# Patient Record
Sex: MALE | Race: ASIAN | NOT HISPANIC OR LATINO | URBAN - METROPOLITAN AREA
[De-identification: names, ages, dates, MRNs, and addresses within clinical notes are randomized per-mention and may not be internally consistent; named-entity substitution may affect disease eponyms.]

---

## 2023-08-29 ENCOUNTER — EMERGENCY (EMERGENCY)
Facility: HOSPITAL | Age: 23
LOS: 0 days | Discharge: ANOTHER HOSPITAL | End: 2023-08-30
Attending: EMERGENCY MEDICINE
Payer: SELF-PAY

## 2023-08-29 VITALS
HEART RATE: 115 BPM | WEIGHT: 251.11 LBS | TEMPERATURE: 98 F | SYSTOLIC BLOOD PRESSURE: 138 MMHG | HEIGHT: 69 IN | OXYGEN SATURATION: 96 % | DIASTOLIC BLOOD PRESSURE: 98 MMHG | RESPIRATION RATE: 18 BRPM

## 2023-08-29 DIAGNOSIS — R20.2 PARESTHESIA OF SKIN: ICD-10-CM

## 2023-08-29 DIAGNOSIS — F60.89 OTHER SPECIFIC PERSONALITY DISORDERS: ICD-10-CM

## 2023-08-29 DIAGNOSIS — F41.9 ANXIETY DISORDER, UNSPECIFIED: ICD-10-CM

## 2023-08-29 DIAGNOSIS — F64.9 GENDER IDENTITY DISORDER, UNSPECIFIED: ICD-10-CM

## 2023-08-29 DIAGNOSIS — F33.1 MAJOR DEPRESSIVE DISORDER, RECURRENT, MODERATE: ICD-10-CM
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
## 2023-08-29 DIAGNOSIS — R45.851 SUICIDAL IDEATIONS: ICD-10-CM

## 2023-08-29 DIAGNOSIS — Z20.822 CONTACT WITH AND (SUSPECTED) EXPOSURE TO COVID-19: ICD-10-CM

## 2023-08-29 DIAGNOSIS — Z56.0 UNEMPLOYMENT, UNSPECIFIED: ICD-10-CM

## 2023-08-29 LAB
AMPHET UR-MCNC: NEGATIVE — SIGNIFICANT CHANGE UP
ANION GAP SERPL CALC-SCNC: 8 MMOL/L — SIGNIFICANT CHANGE UP (ref 5–17)
APAP SERPL-MCNC: < 2 UG/ML (ref 10–30)
APPEARANCE UR: CLEAR — SIGNIFICANT CHANGE UP
BARBITURATES UR SCN-MCNC: NEGATIVE — SIGNIFICANT CHANGE UP
BASOPHILS # BLD AUTO: 0.09 K/UL — SIGNIFICANT CHANGE UP (ref 0–0.2)
BASOPHILS NFR BLD AUTO: 1 % — SIGNIFICANT CHANGE UP (ref 0–2)
BENZODIAZ UR-MCNC: NEGATIVE — SIGNIFICANT CHANGE UP
BILIRUB UR-MCNC: NEGATIVE — SIGNIFICANT CHANGE UP
BUN SERPL-MCNC: 8 MG/DL — SIGNIFICANT CHANGE UP (ref 7–23)
CALCIUM SERPL-MCNC: 9 MG/DL — SIGNIFICANT CHANGE UP (ref 8.5–10.1)
CHLORIDE SERPL-SCNC: 104 MMOL/L — SIGNIFICANT CHANGE UP (ref 96–108)
CO2 SERPL-SCNC: 23 MMOL/L — SIGNIFICANT CHANGE UP (ref 22–31)
COCAINE METAB.OTHER UR-MCNC: NEGATIVE — SIGNIFICANT CHANGE UP
COLOR SPEC: YELLOW — SIGNIFICANT CHANGE UP
CREAT SERPL-MCNC: 0.93 MG/DL — SIGNIFICANT CHANGE UP (ref 0.5–1.3)
DIFF PNL FLD: NEGATIVE — SIGNIFICANT CHANGE UP
EGFR: 119 ML/MIN/1.73M2 — SIGNIFICANT CHANGE UP
EOSINOPHIL # BLD AUTO: 0.1 K/UL — SIGNIFICANT CHANGE UP (ref 0–0.5)
EOSINOPHIL NFR BLD AUTO: 1.1 % — SIGNIFICANT CHANGE UP (ref 0–6)
ETHANOL SERPL-MCNC: <10 MG/DL — SIGNIFICANT CHANGE UP (ref 0–10)
GLUCOSE SERPL-MCNC: 133 MG/DL — HIGH (ref 70–99)
GLUCOSE UR QL: NEGATIVE — SIGNIFICANT CHANGE UP
HCT VFR BLD CALC: 42.5 % — SIGNIFICANT CHANGE UP (ref 39–50)
HGB BLD-MCNC: 14.9 G/DL — SIGNIFICANT CHANGE UP (ref 13–17)
IMM GRANULOCYTES NFR BLD AUTO: 0.5 % — SIGNIFICANT CHANGE UP (ref 0–0.9)
KETONES UR-MCNC: NEGATIVE — SIGNIFICANT CHANGE UP
LEUKOCYTE ESTERASE UR-ACNC: NEGATIVE — SIGNIFICANT CHANGE UP
LYMPHOCYTES # BLD AUTO: 3 K/UL — SIGNIFICANT CHANGE UP (ref 1–3.3)
LYMPHOCYTES # BLD AUTO: 32 % — SIGNIFICANT CHANGE UP (ref 13–44)
MCHC RBC-ENTMCNC: 29.7 PG — SIGNIFICANT CHANGE UP (ref 27–34)
MCHC RBC-ENTMCNC: 35.1 GM/DL — SIGNIFICANT CHANGE UP (ref 32–36)
MCV RBC AUTO: 84.8 FL — SIGNIFICANT CHANGE UP (ref 80–100)
METHADONE UR-MCNC: NEGATIVE — SIGNIFICANT CHANGE UP
MONOCYTES # BLD AUTO: 0.56 K/UL — SIGNIFICANT CHANGE UP (ref 0–0.9)
MONOCYTES NFR BLD AUTO: 6 % — SIGNIFICANT CHANGE UP (ref 2–14)
NEUTROPHILS # BLD AUTO: 5.58 K/UL — SIGNIFICANT CHANGE UP (ref 1.8–7.4)
NEUTROPHILS NFR BLD AUTO: 59.4 % — SIGNIFICANT CHANGE UP (ref 43–77)
NITRITE UR-MCNC: NEGATIVE — SIGNIFICANT CHANGE UP
OPIATES UR-MCNC: NEGATIVE — SIGNIFICANT CHANGE UP
PCP SPEC-MCNC: SIGNIFICANT CHANGE UP
PCP UR-MCNC: NEGATIVE — SIGNIFICANT CHANGE UP
PH UR: 6.5 — SIGNIFICANT CHANGE UP (ref 5–8)
PLATELET # BLD AUTO: 331 K/UL — SIGNIFICANT CHANGE UP (ref 150–400)
POTASSIUM SERPL-MCNC: 4 MMOL/L — SIGNIFICANT CHANGE UP (ref 3.5–5.3)
POTASSIUM SERPL-SCNC: 4 MMOL/L — SIGNIFICANT CHANGE UP (ref 3.5–5.3)
PROT UR-MCNC: NEGATIVE — SIGNIFICANT CHANGE UP
RBC # BLD: 5.01 M/UL — SIGNIFICANT CHANGE UP (ref 4.2–5.8)
RBC # FLD: 13.1 % — SIGNIFICANT CHANGE UP (ref 10.3–14.5)
SALICYLATES SERPL-MCNC: <1.7 MG/DL (ref 2.8–20)
SARS-COV-2 RNA SPEC QL NAA+PROBE: SIGNIFICANT CHANGE UP
SODIUM SERPL-SCNC: 135 MMOL/L — SIGNIFICANT CHANGE UP (ref 135–145)
SP GR SPEC: 1 — LOW (ref 1.01–1.02)
THC UR QL: NEGATIVE — SIGNIFICANT CHANGE UP
UROBILINOGEN FLD QL: NEGATIVE — SIGNIFICANT CHANGE UP
WBC # BLD: 9.38 K/UL — SIGNIFICANT CHANGE UP (ref 3.8–10.5)
WBC # FLD AUTO: 9.38 K/UL — SIGNIFICANT CHANGE UP (ref 3.8–10.5)

## 2023-08-29 PROCEDURE — 99285 EMERGENCY DEPT VISIT HI MDM: CPT

## 2023-08-29 PROCEDURE — 99285 EMERGENCY DEPT VISIT HI MDM: CPT | Mod: 25

## 2023-08-29 PROCEDURE — 80307 DRUG TEST PRSMV CHEM ANLYZR: CPT

## 2023-08-29 PROCEDURE — 80048 BASIC METABOLIC PNL TOTAL CA: CPT

## 2023-08-29 PROCEDURE — 81003 URINALYSIS AUTO W/O SCOPE: CPT

## 2023-08-29 PROCEDURE — 93005 ELECTROCARDIOGRAM TRACING: CPT

## 2023-08-29 PROCEDURE — 90792 PSYCH DIAG EVAL W/MED SRVCS: CPT

## 2023-08-29 PROCEDURE — 93010 ELECTROCARDIOGRAM REPORT: CPT

## 2023-08-29 PROCEDURE — 85025 COMPLETE CBC W/AUTO DIFF WBC: CPT

## 2023-08-29 PROCEDURE — 87635 SARS-COV-2 COVID-19 AMP PRB: CPT

## 2023-08-29 PROCEDURE — 36415 COLL VENOUS BLD VENIPUNCTURE: CPT

## 2023-08-29 RX ORDER — SPIRONOLACTONE 25 MG/1
100 TABLET, FILM COATED ORAL DAILY
Refills: 0 | Status: DISCONTINUED | OUTPATIENT
Start: 2023-08-29 | End: 2023-08-29

## 2023-08-29 RX ORDER — BUPROPION HYDROCHLORIDE 150 MG/1
150 TABLET, EXTENDED RELEASE ORAL DAILY
Refills: 0 | Status: DISCONTINUED | OUTPATIENT
Start: 2023-08-30 | End: 2023-08-30

## 2023-08-29 RX ORDER — ESCITALOPRAM OXALATE 10 MG/1
10 TABLET, FILM COATED ORAL DAILY
Refills: 0 | Status: DISCONTINUED | OUTPATIENT
Start: 2023-08-30 | End: 2023-08-30

## 2023-08-29 RX ORDER — SPIRONOLACTONE 25 MG/1
100 TABLET, FILM COATED ORAL DAILY
Refills: 0 | Status: DISCONTINUED | OUTPATIENT
Start: 2023-08-30 | End: 2023-08-30

## 2023-08-29 SDOH — ECONOMIC STABILITY - INCOME SECURITY: UNEMPLOYMENT, UNSPECIFIED: Z56.0

## 2023-08-29 NOTE — ED BEHAVIORAL HEALTH ASSESSMENT NOTE - SUBSTANCE USE
[email or web address] None known

## 2023-08-29 NOTE — ED PROVIDER NOTE - PROGRESS NOTE DETAILS
pt accepted to [facility]. will transfer pt medical work up in the ED is negative. Pt complaining of hand pain left, by base of thumb, no trauma or strain, moving it fine, had pain for some weeks. Exam is normal, gor rom, no swelling, good sensation, recommend tylenol prn and follow up. No xray warranted at this point. Pt content with plan and complient.

## 2023-08-29 NOTE — ED ADULT NURSE REASSESSMENT NOTE - NS ED NURSE REASSESS COMMENT FT1
Pt resting comfortably in stretcher, resp. even and unlabored. Denies any complaints at this time. Calm and cooperative. Updated on plan of care, verbalizes understanding. 1:1 continues to be at bedside. In NAD.

## 2023-08-29 NOTE — ED BEHAVIORAL HEALTH ASSESSMENT NOTE - DESCRIPTION
Vital Signs Last 24 Hrs  T(C): 36.8 (29 Aug 2023 11:19), Max: 36.8 (29 Aug 2023 11:19)  T(F): 98.2 (29 Aug 2023 11:19), Max: 98.2 (29 Aug 2023 11:19)  HR: 115 (29 Aug 2023 11:19) (115 - 115)  BP: 138/98 (29 Aug 2023 11:19) (138/98 - 138/98)  BP(mean): --  RR: 18 (29 Aug 2023 11:19) (18 - 18)  SpO2: 96% (29 Aug 2023 11:19) (96% - 96%)    Parameters below as of 29 Aug 2023 11:19  Patient On (Oxygen Delivery Method): room air see hospitalist note See HPI

## 2023-08-29 NOTE — ED BEHAVIORAL HEALTH ASSESSMENT NOTE - PATIENT'S CHIEF COMPLAINT
"I guess what was special about the other night is that I actually got up and held the knife up to my throat."

## 2023-08-29 NOTE — ED ADULT TRIAGE NOTE - CHIEF COMPLAINT QUOTE
BIB [employer] badge #[id number] and #[id number] for suicidal ideation. pt reports x2 days ago she was experiencing suicidal thoughts. yesterday she called her counselor and disclosed that she felt like she wanted to harm herself. x2 days ago, pt took knife to throat but was "just holding it there". when asked if she is experiencing homicidal ideation, pt reports "not really, no". pt denies AVH. pt brought into behavioral health room, 1:1 initiated from triage.

## 2023-08-29 NOTE — ED ADULT NURSE NOTE - PAIN RATING/NUMBER SCALE (0-10): ACTIVITY
[] : no respiratory distress [Respiration, Rhythm And Depth] : normal respiratory rhythm and effort [Auscultation Breath Sounds / Voice Sounds] : lungs were clear to auscultation bilaterally [Heart Rate And Rhythm] : heart rate was normal and rhythm regular [Heart Sounds] : normal S1 and S2 [Clean] : clean [Dry] : dry [Healing Well] : healing well [FreeTextEntry5] : right leg [FreeTextEntry3] : trace right lower extremity edema 0 (no pain/absence of nonverbal indicators of pain)

## 2023-08-29 NOTE — ED PROVIDER NOTE - OBJECTIVE STATEMENT
24 y/o male transitioning to female (goes by Mackenzie) with a PMHx of anxiety, depression presents to the ED for evaluation. Pt reports he switched majors in college. Pt states he was not doing well academically, is now on academic suspension and returned home from college. Pt's parents found out he is HRT and they got into an argument. Pt had a knife to his neck 2 days ago but did not cut himself. Denies current SI. +intermittent numbness and tingling thumb of left hand. No other complaints at this time. 24 y/o male transitioning to female (goes by Mackenzie) with a PMHx of anxiety, depression presents to the ED for evaluation. Pt reports he switched majors in college. Pt states he was not doing well academically, is now on academic suspension and returned home from college. Pt's parents found out he is taking HRT and they got into an argument. Pt had a knife to his neck 2 days ago but did not cut himself. Denies current SI. +intermittent numbness and tingling thumb of left hand. No other complaints at this time.

## 2023-08-29 NOTE — ED BEHAVIORAL HEALTH ASSESSMENT NOTE - NSBHATTESTAPPBILLTIME_PSY_A_CORE
I attest my time as GOLDEN is greater than 50% of the total combined time spent on qualifying patient care activities. I have reviewed and verified the documentation.

## 2023-08-29 NOTE — ED PROVIDER NOTE - MUSCULOSKELETAL, MLM
Spine appears normal, range of motion is not limited, no muscle or joint tenderness. Good sensation. Good pulses. Strength normal

## 2023-08-29 NOTE — ED BEHAVIORAL HEALTH ASSESSMENT NOTE - RISK ASSESSMENT
MODERATE CHRONIC RISK     ACUTE RISK FACTORS: Aborted SA    CHRONIC RISK FACTORS: Hx of SA, Hx of MDD    PROTECTIVE FACTORS: No violence history, medication compliance, no access to guns, no global insomnia, no substance abuse, willingness to seek help, no homicidal ideation, hopefulness for future.

## 2023-08-29 NOTE — ED BEHAVIORAL HEALTH ASSESSMENT NOTE - HPI (INCLUDE ILLNESS QUALITY, SEVERITY, DURATION, TIMING, CONTEXT, MODIFYING FACTORS, ASSOCIATED SIGNS AND SYMPTOMS)
Patient is a 23 year old male, transitioning to female, unemployed, domiciled at home with parents, is a student at [employer] but is on academic suspension and home this semester. PPHx of MDD, Cluster B personality disorder. PPHx of inpatient hospitalization? patient vague, but reports was in a partial hospitalization program at [facility]. 1 past SA swallowing rubbing alcohol in 2020, did not seek medical attention. No NSSIB. Presents to ED activated by therapist? after disclosing they held a knife to their throat two nights ago with thoughts of killing self. Psychiatry consulted for evaluation.    Patient presents calm, vague, is evasive, answering most questions "I don't know" or "I guess" makes poor eye contact with writer. Reports therapist activated 911 because "they didn't feel safe discharging me?" Reports stressors at home with parents since they started transitioning in 2019. Patient recently started estrogen which parents found and became upset about because they had made an agreement to not take medication to transition, patient suspects parents thought it was a "phase." However since returning home from being on academic suspension patient has been isolating, having more passive suicidal ideation, mostly at night. Reports two nights ago they walking into the kitchen and held a knife up to their carotid for a few seconds, stating "I guess what was special about the other night is that I actually got up and held the knife up to my throat." Cannot give reason as to why they did not cut. Patient is not forthcoming about history, unable to give concrete answers to most questions. Unable to safety plan at present time.    Collateral from therapist Abdiaziz Rios: Saud has been patients therapist for 4 years and since being on academic probation patient has started to increasingly lose the ability to contract for safety and has decreased in her executive functioning. Since being on academic probation she was started in [facility], however the psychiatrist in [facility] also believes the patient was decompensating and needed a higher level of care. He reports she has had two past suicide attempts, both have been while at home, (drinking rubbing alcohol and hoarding medications) and are mostly exacerbated by familial stressors, parents not supportive of transition and mother being verbally abusive to her. He reports the safest place for her is on campus, however since being on probation has been sent home and not currently connected to treatment. He is advocating for inpatient admission for safety and stabilization.

## 2023-08-29 NOTE — ED ADULT NURSE NOTE - NSFALLUNIVINTERV_ED_ALL_ED
Bed/Stretcher in lowest position, wheels locked, appropriate side rails in place/Call bell, personal items and telephone in reach/Instruct patient to call for assistance before getting out of bed/chair/stretcher/Non-slip footwear applied when patient is off stretcher/[place] to call system/Physically safe environment - no spills, clutter or unnecessary equipment/Purposeful proactive rounding/Room/bathroom lighting operational, light cord in reach

## 2023-08-29 NOTE — ED PROVIDER NOTE - CLINICAL SUMMARY MEDICAL DECISION MAKING FREE TEXT BOX
Plan: med clearance for psych eval 22 y/o male transitioning to female (goes by Iris) with a PMHx of anxiety, depression presents to the ED for evaluation. Pt reports he switched majors in college. Pt states he was not doing well academically, is now on academic suspension and returned home from college. Pt's parents found out he is taking HRT and they got into an argument. Pt had a knife to his neck 2 days ago but did not cut himself. Denies current SI. +intermittent numbness and tingling thumb of left hand. No other complaints at this time.        Plan: med clearance for psych eval

## 2023-08-29 NOTE — ED BEHAVIORAL HEALTH ASSESSMENT NOTE - SUMMARY
Patient is a 23 year old male, transitioning to female, unemployed, domiciled at home with parents, is a student at [employer] but is on academic suspension and home this semester. PPHx of MDD, Cluster B personality disorder. PPHx of inpatient hospitalization? patient vague, but reports was in a partial hospitalization program at [facility]. 1 past SA swallowing rubbing alcohol in 2020, did not seek medical attention. No NSSIB. Presents to ED activated by therapist? after disclosing they held a knife to their throat two nights ago with thoughts of killing self. Psychiatry consulted for evaluation.    Patient presents with suicidal ideation most consistent with MDD in the setting of familial and school stressors. These symptoms represent a change from baseline from which the patient cannot be reasonably expected to improve with current level of care. The patient presents with risk requiring inpatient psychiatric hospitalization for safety and stabilization.     Plan:    1) Hold for inpatient bed 9.39 (transgender male to female or single room)  2) Continue home medications

## 2023-08-29 NOTE — ED ADULT NURSE REASSESSMENT NOTE - NS ED NURSE REASSESS COMMENT FT1
Received report from previous RN. Patient is lying on stretcher on semi-fowlers position. No signs of distress noted, Vital signs stable. Patient has no complaints at this time. Pt on continuos 1:1, belongings locked with security, bed in lowest position, safety and comfort maintained. Pt is calm and cooperative at this time. Pt aware of involuntary admission. Pt verbalizes understanding.

## 2023-08-30 VITALS
TEMPERATURE: 98 F | DIASTOLIC BLOOD PRESSURE: 87 MMHG | SYSTOLIC BLOOD PRESSURE: 121 MMHG | OXYGEN SATURATION: 98 % | RESPIRATION RATE: 18 BRPM | HEART RATE: 102 BPM

## 2023-08-30 DIAGNOSIS — R45.851 SUICIDAL IDEATIONS: ICD-10-CM

## 2023-08-30 PROCEDURE — 99284 EMERGENCY DEPT VISIT MOD MDM: CPT

## 2023-08-30 RX ADMIN — SPIRONOLACTONE 100 MILLIGRAM(S): 25 TABLET, FILM COATED ORAL at 08:47

## 2023-08-30 RX ADMIN — BUPROPION HYDROCHLORIDE 150 MILLIGRAM(S): 150 TABLET, EXTENDED RELEASE ORAL at 08:47

## 2023-08-30 RX ADMIN — ESCITALOPRAM OXALATE 10 MILLIGRAM(S): 10 TABLET, FILM COATED ORAL at 08:47

## 2023-08-30 NOTE — ED BEHAVIORAL HEALTH NOTE - BEHAVIORAL HEALTH NOTE
Per RN patient remains gowned, wanded, and in private room on 1:1 observation. No SI/HI/AH/VH elicited; patient remains calm and in behavioral control, no PRN medications required. Observed to be sleeping in bed overnight. Patient unaccompanied by social supports at this time. Patient pending psychiatric bed placement at this time.

## 2023-08-30 NOTE — ED ADULT NURSE REASSESSMENT NOTE - NS ED NURSE REASSESS COMMENT FT1
Care assumed from AGUSTINA Esposito. Patient seen at bedside. [facility] ambulance arrived for transfer. Patient updated on plan for transfer to [facility] and patient compliant with transfer

## 2023-08-30 NOTE — ED BEHAVIORAL HEALTH PROGRESS NOTE - CASE SUMMARY/FORMULATION (CLEARLY DOCUMENT RATIONALE FOR DISPOSITION CHANGE)
Patient is a 23 year old male, transitioning to female, unemployed, domiciled at home with parents, is a student at [employer] but is on academic suspension and home this semester. PPHx of MDD, Cluster B personality disorder. PPHx of inpatient hospitalization? patient vague, but reports was in a partial hospitalization program at [facility]. 1 past SA swallowing rubbing alcohol in 2020, did not seek medical attention. No NSSIB. Presents to ED activated by therapist? after disclosing they held a knife to their throat two nights ago with thoughts of killing self. Psychiatry consulted for evaluation.    Patient seen at bedside this AM, is in good behavioral control. Reports she does think she needs psychiatric help for her depression and is scared that she held a knife to throat 3 days ago. Continues to have passive suicidal ideation. These symptoms represent a change from baseline from which the patient cannot be reasonably expected to improve with current level of care. The patient presents with risk requiring inpatient psychiatric hospitalization for safety and stabilization.

## 2023-08-30 NOTE — ED BEHAVIORAL HEALTH PROGRESS NOTE - DETAILS:
Patient seen at bedside this AM, is in good behavioral control. Reports she does think she needs psychiatric help for her depression and is scared that she held a knife to throat 3 days ago. Continues to have passive suicidal ideation.

## 2023-08-30 NOTE — ED BEHAVIORAL HEALTH PROGRESS NOTE - SUMMARY
Patient is a 23 year old male, transitioning to female, unemployed, domiciled at home with parents, is a student at [employer] but is on academic suspension and home this semester. PPHx of MDD, Cluster B personality disorder. PPHx of inpatient hospitalization? patient vague, but reports was in a partial hospitalization program at [facility]. 1 past SA swallowing rubbing alcohol in 2020, did not seek medical attention. No NSSIB. Presents to ED activated by therapist? after disclosing they held a knife to their throat two nights ago with thoughts of killing self. Psychiatry consulted for evaluation.    Patient presents with suicidal ideation most consistent with MDD in the setting of familial and school stressors. These symptoms represent a change from baseline from which the patient cannot be reasonably expected to improve with current level of care. The patient presents with risk requiring inpatient psychiatric hospitalization for safety and stabilization.     Plan:    1) Hold for inpatient bed (transgender male to female or single room)  2) Continue home medications

## 2023-08-30 NOTE — ED ADULT NURSE REASSESSMENT NOTE - NS ED NURSE REASSESS COMMENT FT1
patient received from previous RN. pt sleeping at this time. 1:1 and safety precautions maintained. pt pending bed placement

## 2023-08-30 NOTE — ED ADULT NURSE REASSESSMENT NOTE - NS ED NURSE REASSESS COMMENT FT1
pt requested hormone replacement patch brought from home. MD Carrasquillo made aware. Home dose of estradiol transdermal patch provided to patient, placed on Right hip by patient.

## 2023-08-30 NOTE — ED BEHAVIORAL HEALTH NOTE - BEHAVIORAL HEALTH NOTE
Patient accepted to [facility] by Dr. Copeland.  Patient to be transferred via ambulance set up by  in the ED.  Patient and father aware of the transfer and in agreement.  Auth will be obtained within 24 hours.